# Patient Record
Sex: MALE | Race: WHITE | Employment: FULL TIME | URBAN - METROPOLITAN AREA
[De-identification: names, ages, dates, MRNs, and addresses within clinical notes are randomized per-mention and may not be internally consistent; named-entity substitution may affect disease eponyms.]

---

## 2019-12-21 ENCOUNTER — HOSPITAL ENCOUNTER (EMERGENCY)
Age: 29
Discharge: HOME | End: 2019-12-21
Attending: EMERGENCY MEDICINE

## 2019-12-21 ENCOUNTER — APPOINTMENT (OUTPATIENT)
Dept: GENERAL RADIOLOGY | Age: 29
End: 2019-12-21

## 2019-12-21 VITALS
BODY MASS INDEX: 36.45 KG/M2 | HEIGHT: 78 IN | WEIGHT: 315 LBS | HEART RATE: 88 BPM | DIASTOLIC BLOOD PRESSURE: 82 MMHG | RESPIRATION RATE: 18 BRPM | OXYGEN SATURATION: 95 % | SYSTOLIC BLOOD PRESSURE: 148 MMHG | TEMPERATURE: 98 F

## 2019-12-21 DIAGNOSIS — R07.9 CHEST PAIN, UNSPECIFIED TYPE: Primary | ICD-10-CM

## 2019-12-21 LAB
A/G RATIO: 1.5 (ref 1.1–2.2)
ALBUMIN SERPL-MCNC: 4.3 G/DL (ref 3.4–5)
ALP BLD-CCNC: 68 U/L (ref 40–129)
ALT SERPL-CCNC: 57 U/L (ref 10–40)
ANION GAP SERPL CALCULATED.3IONS-SCNC: 14 MMOL/L (ref 3–16)
AST SERPL-CCNC: 27 U/L (ref 15–37)
BASOPHILS ABSOLUTE: 0.1 K/UL (ref 0–0.2)
BASOPHILS RELATIVE PERCENT: 1 %
BILIRUB SERPL-MCNC: <0.2 MG/DL (ref 0–1)
BUN BLDV-MCNC: 14 MG/DL (ref 7–20)
CALCIUM SERPL-MCNC: 9.6 MG/DL (ref 8.3–10.6)
CHLORIDE BLD-SCNC: 98 MMOL/L (ref 99–110)
CO2: 25 MMOL/L (ref 21–32)
CREAT SERPL-MCNC: 0.7 MG/DL (ref 0.9–1.3)
D DIMER: <200 NG/ML DDU (ref 0–229)
EOSINOPHILS ABSOLUTE: 0.1 K/UL (ref 0–0.6)
EOSINOPHILS RELATIVE PERCENT: 1.6 %
GFR AFRICAN AMERICAN: >60
GFR NON-AFRICAN AMERICAN: >60
GLOBULIN: 2.8 G/DL
GLUCOSE BLD-MCNC: 113 MG/DL (ref 70–99)
HCT VFR BLD CALC: 44.3 % (ref 40.5–52.5)
HEMOGLOBIN: 15.3 G/DL (ref 13.5–17.5)
LIPASE: 14 U/L (ref 13–60)
LYMPHOCYTES ABSOLUTE: 1.6 K/UL (ref 1–5.1)
LYMPHOCYTES RELATIVE PERCENT: 26 %
MCH RBC QN AUTO: 31.1 PG (ref 26–34)
MCHC RBC AUTO-ENTMCNC: 34.5 G/DL (ref 31–36)
MCV RBC AUTO: 90.2 FL (ref 80–100)
MONOCYTES ABSOLUTE: 0.6 K/UL (ref 0–1.3)
MONOCYTES RELATIVE PERCENT: 10 %
NEUTROPHILS ABSOLUTE: 3.7 K/UL (ref 1.7–7.7)
NEUTROPHILS RELATIVE PERCENT: 61.4 %
PDW BLD-RTO: 13.1 % (ref 12.4–15.4)
PLATELET # BLD: 197 K/UL (ref 135–450)
PMV BLD AUTO: 9.6 FL (ref 5–10.5)
POTASSIUM SERPL-SCNC: 4.2 MMOL/L (ref 3.5–5.1)
RBC # BLD: 4.91 M/UL (ref 4.2–5.9)
SODIUM BLD-SCNC: 137 MMOL/L (ref 136–145)
TOTAL PROTEIN: 7.1 G/DL (ref 6.4–8.2)
TROPONIN: <0.01 NG/ML
TROPONIN: <0.01 NG/ML
WBC # BLD: 6.1 K/UL (ref 4–11)

## 2019-12-21 PROCEDURE — 96375 TX/PRO/DX INJ NEW DRUG ADDON: CPT

## 2019-12-21 PROCEDURE — 83690 ASSAY OF LIPASE: CPT

## 2019-12-21 PROCEDURE — 6370000000 HC RX 637 (ALT 250 FOR IP): Performed by: PHYSICIAN ASSISTANT

## 2019-12-21 PROCEDURE — 85379 FIBRIN DEGRADATION QUANT: CPT

## 2019-12-21 PROCEDURE — 36415 COLL VENOUS BLD VENIPUNCTURE: CPT

## 2019-12-21 PROCEDURE — 80053 COMPREHEN METABOLIC PANEL: CPT

## 2019-12-21 PROCEDURE — 71046 X-RAY EXAM CHEST 2 VIEWS: CPT

## 2019-12-21 PROCEDURE — 6360000002 HC RX W HCPCS: Performed by: PHYSICIAN ASSISTANT

## 2019-12-21 PROCEDURE — 96374 THER/PROPH/DIAG INJ IV PUSH: CPT

## 2019-12-21 PROCEDURE — 99285 EMERGENCY DEPT VISIT HI MDM: CPT

## 2019-12-21 PROCEDURE — 85025 COMPLETE CBC W/AUTO DIFF WBC: CPT

## 2019-12-21 PROCEDURE — 2500000003 HC RX 250 WO HCPCS: Performed by: PHYSICIAN ASSISTANT

## 2019-12-21 PROCEDURE — 93005 ELECTROCARDIOGRAM TRACING: CPT | Performed by: EMERGENCY MEDICINE

## 2019-12-21 PROCEDURE — 84484 ASSAY OF TROPONIN QUANT: CPT

## 2019-12-21 RX ORDER — RANITIDINE 150 MG/1
150 TABLET ORAL 2 TIMES DAILY
COMMUNITY
End: 2021-06-08

## 2019-12-21 RX ORDER — KETOROLAC TROMETHAMINE 30 MG/ML
30 INJECTION, SOLUTION INTRAMUSCULAR; INTRAVENOUS ONCE
Status: COMPLETED | OUTPATIENT
Start: 2019-12-21 | End: 2019-12-21

## 2019-12-21 RX ADMIN — FAMOTIDINE 20 MG: 10 INJECTION, SOLUTION INTRAVENOUS at 20:42

## 2019-12-21 RX ADMIN — LIDOCAINE HYDROCHLORIDE: 20 SOLUTION ORAL; TOPICAL at 20:42

## 2019-12-21 RX ADMIN — KETOROLAC TROMETHAMINE 30 MG: 30 INJECTION, SOLUTION INTRAMUSCULAR at 23:04

## 2019-12-21 ASSESSMENT — HEART SCORE: ECG: 0

## 2019-12-21 ASSESSMENT — PAIN DESCRIPTION - LOCATION: LOCATION: CHEST

## 2019-12-21 ASSESSMENT — PAIN DESCRIPTION - DESCRIPTORS: DESCRIPTORS: BURNING

## 2019-12-21 ASSESSMENT — ENCOUNTER SYMPTOMS
BACK PAIN: 0
VOMITING: 0
NAUSEA: 0
EYES NEGATIVE: 1
SHORTNESS OF BREATH: 0
ABDOMINAL PAIN: 0
COUGH: 0
COLOR CHANGE: 0

## 2019-12-21 ASSESSMENT — PAIN SCALES - GENERAL
PAINLEVEL_OUTOF10: 2
PAINLEVEL_OUTOF10: 6
PAINLEVEL_OUTOF10: 6

## 2019-12-21 ASSESSMENT — PAIN DESCRIPTION - ORIENTATION: ORIENTATION: MID

## 2019-12-21 ASSESSMENT — PAIN DESCRIPTION - PAIN TYPE: TYPE: ACUTE PAIN

## 2019-12-21 ASSESSMENT — PAIN DESCRIPTION - FREQUENCY: FREQUENCY: CONTINUOUS

## 2019-12-22 LAB
EKG ATRIAL RATE: 96 BPM
EKG DIAGNOSIS: NORMAL
EKG P AXIS: 53 DEGREES
EKG P-R INTERVAL: 148 MS
EKG Q-T INTERVAL: 344 MS
EKG QRS DURATION: 94 MS
EKG QTC CALCULATION (BAZETT): 434 MS
EKG R AXIS: 5 DEGREES
EKG T AXIS: 24 DEGREES
EKG VENTRICULAR RATE: 96 BPM

## 2019-12-22 PROCEDURE — 93010 ELECTROCARDIOGRAM REPORT: CPT | Performed by: INTERNAL MEDICINE

## 2021-02-01 NOTE — PROGRESS NOTES
[facility]     Outpatient Cardiology         Chief Complaint   Patient presents with    Chest Pain     left-sided    Shortness of Breath     with heavy exertion    Other     hx of SVT       HPI     Media Sera a 31 y.o. male here for chest pain. Hx of HTN, HLD, SVT. Prior history of SVT, no recurrence, taking diltiazem. Hypertension, uncontrolled today, will be starting beta-blockers prior to CTA, after that we will likely need to add second agent either amlodipine or losartan. Chest pain, precordial/left-sided, sharp in nature, dull sometimes, seems that sometimes the pain will get better with rest and somewhat worse with exertion but not every time, no other particular triggering or relieving factors. No associated symptoms with the pain. Seems to be mild to moderate nature. Does have a strong family history of coronary disease. PMH  Past Medical History:   Diagnosis Date    Hypertension     SVT (supraventricular tachycardia) (HCC)        PSH  Past Surgical History:   Procedure Laterality Date    LEG SURGERY      from [facility] hunting accident 2002    TYMPANOSTOMY TUBE PLACEMENT          Social HIstory  Social History     Tobacco Use    Smoking status: Current Every Day Smoker     Packs/day: 1.00    Smokeless tobacco: Never Used   Substance Use Topics    Alcohol use: Not on file    Drug use: Never       Family History  Family History   Problem Relation Age of Onset    Hypertension Mother     High Cholesterol Mother     Hypertension Father     Heart Surgery Father     Heart Attack Father        Allergies   Allergies   Allergen Reactions    Amoxicillin     Penicillins        Medications:     Home Medications:  Were reviewed and are listed in nursing record. and/or listed below    Prior to Admission medications    Medication Sig Start Date End Date Taking?  Authorizing Provider   Omeprazole Magnesium (PRILOSEC OTC PO) Take by mouth   Yes Historical Provider, MD metoprolol tartrate (LOPRESSOR) 25 MG tablet Take 1 tablet by mouth 2 times daily for 4 days Take 3 days before CTA 2/2/21 2/6/21 Yes Faiza Conley MD   diltiazem (CARDIZEM) 30 MG tablet Take 300 mg by mouth daily   Yes Historical Provider, MD   ranitidine (ZANTAC) 150 MG tablet Take 150 mg by mouth 2 times daily    Historical Provider, MD        Review of Systems   Constitutional: Negative for activity change, appetite change, diaphoresis, fatigue, fever and unexpected weight change. HENT: Negative for congestion, facial swelling, mouth sores and nosebleeds. Eyes: Negative for discharge and visual disturbance. Respiratory: Negative for cough, chest tightness, shortness of breath and wheezing. Cardiovascular: Positive for chest pain. Negative for palpitations and leg swelling. Gastrointestinal: Negative for abdominal distention, abdominal pain, blood in stool and vomiting. Endocrine: Negative for cold intolerance, heat intolerance and polyuria. Genitourinary: Negative for difficulty urinating, dysuria, frequency and hematuria. Musculoskeletal: Negative for back pain, joint swelling, myalgias and neck pain. Skin: Negative for color change, pallor and rash. Allergic/Immunologic: Negative for immunocompromised state. Neurological: Negative for dizziness, syncope, weakness, light-headedness, numbness and headaches. Hematological: Negative for adenopathy. Does not bruise/bleed easily. Psychiatric/Behavioral: Negative for behavioral problems, confusion, decreased concentration and suicidal ideas. The patient is not nervous/anxious.         Vitals:    02/02/21 1543   BP: (!) 146/100   Pulse:    Temp:     Weight: (!) 391 lb 6.4 oz (177.5 kg)       Vitals:    02/02/21 1540 02/02/21 1543   BP: (!) 150/88 (!) 146/100   Pulse: 106    Temp: 97.4 °F (36.3 °C)    Weight: (!) 391 lb 6.4 oz (177.5 kg)    Height: 6' 6\" (1.981 m)        BP Readings from Last 3 Encounters:   02/02/21 (!) 146/100 12/21/19 (!) 148/82       Wt Readings from Last 3 Encounters:   02/02/21 (!) 391 lb 6.4 oz (177.5 kg)   12/21/19 (!) 360 lb (163.3 kg)       Physical Exam  Constitutional:       General: He is not in acute distress. Appearance: He is well-developed. He is not diaphoretic. HENT:      Head: Normocephalic and atraumatic. Eyes:      Pupils: Pupils are equal, round, and reactive to light. Neck:      Musculoskeletal: Normal range of motion. Thyroid: No thyromegaly. Vascular: No JVD. Cardiovascular:      Rate and Rhythm: Normal rate and regular rhythm. Chest Wall: PMI is not displaced. Heart sounds: Normal heart sounds, S1 normal and S2 normal. No murmur. No friction rub. No gallop. Pulmonary:      Effort: Pulmonary effort is normal. No respiratory distress. Breath sounds: Normal breath sounds. No stridor. No wheezing or rales. Chest:      Chest wall: No tenderness. Abdominal:      General: Bowel sounds are normal. There is no distension. Palpations: Abdomen is soft. Tenderness: There is no abdominal tenderness. There is no guarding or rebound. Musculoskeletal: Normal range of motion. General: No tenderness. Lymphadenopathy:      Cervical: No cervical adenopathy. Skin:     General: Skin is warm and dry. Findings: No erythema or rash. Neurological:      Mental Status: He is alert and oriented to person, place, and time. Coordination: Coordination normal.   Psychiatric:         Behavior: Behavior normal.         Thought Content:  Thought content normal.         Judgment: Judgment normal.         Labs:       Lab Results   Component Value Date    WBC 6.1 12/21/2019    HGB 15.3 12/21/2019    HCT 44.3 12/21/2019    MCV 90.2 12/21/2019     12/21/2019     Lab Results   Component Value Date     12/21/2019    K 4.2 12/21/2019    CL 98 (L) 12/21/2019    CO2 25 12/21/2019    BUN 14 12/21/2019    CREATININE 0.7 (L) 12/21/2019 GLUCOSE 113 (H) 12/21/2019    CALCIUM 9.6 12/21/2019    PROT 7.1 12/21/2019    LABALBU 4.3 12/21/2019    BILITOT <0.2 12/21/2019    ALKPHOS 68 12/21/2019    AST 27 12/21/2019    ALT 57 (H) 12/21/2019    LABGLOM >60 12/21/2019    GFRAA >60 12/21/2019    AGRATIO 1.5 12/21/2019    GLOB 2.8 12/21/2019         No results found for: CHOL  No results found for: TRIG  No results found for: HDL  No results found for: LDLCHOLESTEROL, LDLCALC  No results found for: LABVLDL, VLDL  No results found for: CHOLHDLRATIO    No results found for: INR, PROTIME    The ASCVD Risk score (Christine Salomon, et al., 2013) failed to calculate for the following reasons: The 2013 ASCVD risk score is only valid for ages 36 to 78      Imaging:       Last ECG (if available):  NSR    Last Monitor/Holter    Last Stress (if available):    Last Cath (if available):    Last TTE/GABINO(if available):    Last CMR  (if available):      Assessment / Plan:     SVT (supraventricular tachycardia) (HCC)  Prior history of SVT, no recurrence. Continue diltiazem. Essential hypertension  Uncontrolled. Will likely start losartan or amlodipine after CTA    Precordial pain  Some typical and atypical features, enough risk factors, currently family history of coronary disease. We will plan for CTA. Instructed to take beta-blockers prior to CTA. Follow up in 1 months. I had the opportunity to review the clinical symptoms and presentation of Alia Alfaro. Patient's allergies and medications were reviewed and updated. Patient's past medical, surgical, social and family history were reviewed and updated. Patient's testing including laboratory, ECGs, monitor, imaging (TTE,GABINO,CMR,cath) were reviewed. Tobacco use was discussed with the patient and educated on the negative effects. I have asked the patient to not utilize these agents. All questions and concerns were addressed to the patient/family. Alternatives to my treatment were discussed. The note was completed using EMR. Every effort wasmade to ensure accuracy; however, inadvertent computerized transcription errors may be present. Thank you for allowing me to participate in thecare or [address] [place] [address].  Dewayne Cyr MD, [facility]

## 2021-02-02 ENCOUNTER — OFFICE VISIT (OUTPATIENT)
Dept: CARDIOLOGY CLINIC | Age: 31
End: 2021-02-02

## 2021-02-02 VITALS
WEIGHT: 315 LBS | HEIGHT: 78 IN | BODY MASS INDEX: 36.45 KG/M2 | DIASTOLIC BLOOD PRESSURE: 100 MMHG | SYSTOLIC BLOOD PRESSURE: 146 MMHG | TEMPERATURE: 97.4 F | HEART RATE: 106 BPM

## 2021-02-02 DIAGNOSIS — I47.1 SVT (SUPRAVENTRICULAR TACHYCARDIA) (HCC): ICD-10-CM

## 2021-02-02 DIAGNOSIS — R07.9 CHEST PAIN, UNSPECIFIED TYPE: Primary | ICD-10-CM

## 2021-02-02 DIAGNOSIS — I10 ESSENTIAL HYPERTENSION: ICD-10-CM

## 2021-02-02 DIAGNOSIS — R07.2 PRECORDIAL PAIN: ICD-10-CM

## 2021-02-02 DIAGNOSIS — R07.89 OTHER CHEST PAIN: ICD-10-CM

## 2021-02-02 PROCEDURE — 93000 ELECTROCARDIOGRAM COMPLETE: CPT | Performed by: INTERNAL MEDICINE

## 2021-02-02 PROCEDURE — 99204 OFFICE O/P NEW MOD 45 MIN: CPT | Performed by: INTERNAL MEDICINE

## 2021-02-03 PROBLEM — I10 ESSENTIAL HYPERTENSION: Status: ACTIVE | Noted: 2021-02-03

## 2021-02-03 PROBLEM — R07.2 PRECORDIAL PAIN: Status: ACTIVE | Noted: 2021-02-03

## 2021-02-03 PROBLEM — I47.1 SVT (SUPRAVENTRICULAR TACHYCARDIA) (HCC): Status: ACTIVE | Noted: 2021-02-03

## 2021-02-03 ASSESSMENT — ENCOUNTER SYMPTOMS
CHEST TIGHTNESS: 0
SHORTNESS OF BREATH: 0
ABDOMINAL PAIN: 0
COLOR CHANGE: 0
EYE DISCHARGE: 0
VOMITING: 0
COUGH: 0
BLOOD IN STOOL: 0
WHEEZING: 0
ABDOMINAL DISTENTION: 0
BACK PAIN: 0
FACIAL SWELLING: 0

## 2021-02-03 NOTE — ASSESSMENT & PLAN NOTE
Some typical and atypical features, enough risk factors, currently family history of coronary disease. We will plan for CTA. Instructed to take beta-blockers prior to CTA.

## 2021-02-26 ENCOUNTER — HOSPITAL ENCOUNTER (OUTPATIENT)
Dept: CT IMAGING | Age: 31
Discharge: HOME | End: 2021-02-26

## 2021-02-26 VITALS — SYSTOLIC BLOOD PRESSURE: 140 MMHG | HEART RATE: 65 BPM | DIASTOLIC BLOOD PRESSURE: 90 MMHG

## 2021-02-26 DIAGNOSIS — R07.9 CHEST PAIN, UNSPECIFIED TYPE: ICD-10-CM

## 2021-02-26 PROCEDURE — 2500000003 HC RX 250 WO HCPCS: Performed by: RADIOLOGY

## 2021-02-26 PROCEDURE — 75574 CT ANGIO HRT W/3D IMAGE: CPT

## 2021-02-26 PROCEDURE — 6370000000 HC RX 637 (ALT 250 FOR IP): Performed by: RADIOLOGY

## 2021-02-26 PROCEDURE — 6360000004 HC RX CONTRAST MEDICATION: Performed by: INTERNAL MEDICINE

## 2021-02-26 RX ORDER — NITROGLYCERIN 0.4 MG/1
0.4 TABLET SUBLINGUAL ONCE
Status: COMPLETED | OUTPATIENT
Start: 2021-02-26 | End: 2021-02-26

## 2021-02-26 RX ORDER — METOPROLOL TARTRATE 5 MG/5ML
5 INJECTION INTRAVENOUS EVERY 5 MIN PRN
Status: COMPLETED | OUTPATIENT
Start: 2021-02-26 | End: 2021-02-26

## 2021-02-26 RX ADMIN — IOPAMIDOL 80 ML: 755 INJECTION, SOLUTION INTRAVENOUS at 14:12

## 2021-02-26 RX ADMIN — METOPROLOL TARTRATE 5 MG: 5 INJECTION INTRAVENOUS at 13:30

## 2021-02-26 RX ADMIN — NITROGLYCERIN 0.4 MG: 0.4 TABLET SUBLINGUAL at 13:42

## 2021-02-26 RX ADMIN — METOPROLOL TARTRATE 5 MG: 5 INJECTION INTRAVENOUS at 13:42

## 2021-02-26 RX ADMIN — METOPROLOL TARTRATE 5 MG: 5 INJECTION INTRAVENOUS at 13:37

## 2021-02-26 RX ADMIN — METOPROLOL TARTRATE 5 MG: 5 INJECTION INTRAVENOUS at 14:04

## 2021-02-26 NOTE — FLOWSHEET NOTE
02/26/21 1415 02/26/21 1430 02/26/21 1500   Vital Signs   Pulse 64 63 65   BP (!) 144/88 127/82 (!) 140/90       Pt arrived to [facility] post CTA test. VS obtained per protocol, see above. Pt denies any acute needs at this time. PIV deaccessed   Per protocol, site CDI gauze and tape in place to site. Discharge instructions provided to Pt and Pt verbalized understanding. All pt belongings gathered and pt ambulated to front entrance where he drive himself home.

## 2021-02-26 NOTE — FLOWSHEET NOTE
Patient arrived alert and orientated x 4, ambulatory, denies pain. Initial HR 80 SR, 100% on RA. CTA Coronary study done without complication. Patient given IV Metoprolol 20 mg  and 1 SL Nitro. Patient required 1 extra dose to achieve optimal hr for study. Orders per Dr. Dani Stauffer. 151/77  See flow sheets for VS.     Patient was taken to [facility] after study in stable condition  and recovered for 30 minutes.

## 2021-03-02 ENCOUNTER — OFFICE VISIT (OUTPATIENT)
Dept: CARDIOLOGY CLINIC | Age: 31
End: 2021-03-02

## 2021-03-02 VITALS
SYSTOLIC BLOOD PRESSURE: 140 MMHG | TEMPERATURE: 98.1 F | HEIGHT: 78 IN | BODY MASS INDEX: 36.45 KG/M2 | HEART RATE: 96 BPM | WEIGHT: 315 LBS | DIASTOLIC BLOOD PRESSURE: 90 MMHG | OXYGEN SATURATION: 98 %

## 2021-03-02 DIAGNOSIS — I47.1 SVT (SUPRAVENTRICULAR TACHYCARDIA) (HCC): ICD-10-CM

## 2021-03-02 DIAGNOSIS — I10 ESSENTIAL HYPERTENSION: ICD-10-CM

## 2021-03-02 DIAGNOSIS — R07.2 PRECORDIAL PAIN: ICD-10-CM

## 2021-03-02 PROCEDURE — 99214 OFFICE O/P EST MOD 30 MIN: CPT | Performed by: INTERNAL MEDICINE

## 2021-03-02 RX ORDER — OLMESARTAN MEDOXOMIL 20 MG/1
20 TABLET ORAL DAILY
Qty: 90 TABLET | Refills: 3 | Status: SHIPPED | OUTPATIENT
Start: 2021-03-02

## 2021-03-02 ASSESSMENT — ENCOUNTER SYMPTOMS
WHEEZING: 0
EYE DISCHARGE: 0
BACK PAIN: 0
COUGH: 0
BLOOD IN STOOL: 0
VOMITING: 0
COLOR CHANGE: 0
ABDOMINAL DISTENTION: 0
ABDOMINAL PAIN: 0
SHORTNESS OF BREATH: 0
FACIAL SWELLING: 0
CHEST TIGHTNESS: 0

## 2021-03-02 NOTE — PROGRESS NOTES
appetite change, diaphoresis, fatigue, fever and unexpected weight change. HENT: Negative for congestion, facial swelling, mouth sores and nosebleeds. Eyes: Negative for discharge and visual disturbance. Respiratory: Negative for cough, chest tightness, shortness of breath and wheezing. Cardiovascular: Negative for chest pain, palpitations and leg swelling. Gastrointestinal: Negative for abdominal distention, abdominal pain, blood in stool and vomiting. Endocrine: Negative for cold intolerance, heat intolerance and polyuria. Genitourinary: Negative for difficulty urinating, dysuria, frequency and hematuria. Musculoskeletal: Negative for back pain, joint swelling, myalgias and neck pain. Skin: Negative for color change, pallor and rash. Allergic/Immunologic: Negative for immunocompromised state. Neurological: Negative for dizziness, syncope, weakness, light-headedness, numbness and headaches. Hematological: Negative for adenopathy. Does not bruise/bleed easily. Psychiatric/Behavioral: Negative for behavioral problems, confusion, decreased concentration and suicidal ideas. The patient is not nervous/anxious. Vitals:    03/02/21 1235   BP: (!) 140/90   Pulse:    Temp:    SpO2:     Weight: (!) 384 lb 3.2 oz (174.3 kg)       Vitals:    03/02/21 1231 03/02/21 1235   BP: (!) 144/100 (!) 140/90   Site: Left Upper Arm Left Upper Arm   Position: Sitting Sitting   Cuff Size: Large Adult Large Adult   Pulse: 96    Temp: 98.1 °F (36.7 °C)    SpO2: 98%    Weight: (!) 384 lb 3.2 oz (174.3 kg)    Height: 6' 6\" (1.981 m)        BP Readings from Last 3 Encounters:   03/02/21 (!) 140/90   02/26/21 (!) 140/90   02/02/21 (!) 146/100       Wt Readings from Last 3 Encounters:   03/02/21 (!) 384 lb 3.2 oz (174.3 kg)   02/02/21 (!) 391 lb 6.4 oz (177.5 kg)   12/21/19 (!) 360 lb (163.3 kg)       Physical Exam  Constitutional:       General: He is not in acute distress. Appearance: He is well-developed. He is not diaphoretic. HENT:      Head: Normocephalic and atraumatic. Eyes:      Pupils: Pupils are equal, round, and reactive to light. Neck:      Musculoskeletal: Normal range of motion. Thyroid: No thyromegaly. Vascular: No JVD. Cardiovascular:      Rate and Rhythm: Normal rate and regular rhythm. Chest Wall: PMI is not displaced. Heart sounds: Normal heart sounds, S1 normal and S2 normal. No murmur. No friction rub. No gallop. Pulmonary:      Effort: Pulmonary effort is normal. No respiratory distress. Breath sounds: Normal breath sounds. No stridor. No wheezing or rales. Chest:      Chest wall: No tenderness. Abdominal:      General: Bowel sounds are normal. There is no distension. Palpations: Abdomen is soft. Tenderness: There is no abdominal tenderness. There is no guarding or rebound. Musculoskeletal: Normal range of motion. General: No tenderness. Lymphadenopathy:      Cervical: No cervical adenopathy. Skin:     General: Skin is warm and dry. Findings: No erythema or rash. Neurological:      Mental Status: He is alert and oriented to person, place, and time. Coordination: Coordination normal.   Psychiatric:         Behavior: Behavior normal.         Thought Content:  Thought content normal.         Judgment: Judgment normal.         Labs:       Lab Results   Component Value Date    WBC 6.1 12/21/2019    HGB 15.3 12/21/2019    HCT 44.3 12/21/2019    MCV 90.2 12/21/2019     12/21/2019     Lab Results   Component Value Date     12/21/2019    K 4.2 12/21/2019    CL 98 (L) 12/21/2019    CO2 25 12/21/2019    BUN 14 12/21/2019    CREATININE 0.7 (L) 12/21/2019    GLUCOSE 113 (H) 12/21/2019    CALCIUM 9.6 12/21/2019    PROT 7.1 12/21/2019    LABALBU 4.3 12/21/2019    BILITOT <0.2 12/21/2019    ALKPHOS 68 12/21/2019    AST 27 12/21/2019    ALT 57 (H) 12/21/2019    LABGLOM >60 12/21/2019    GFRAA >60 12/21/2019    AGRATIO 1.5 12/21/2019    GLOB 2.8 12/21/2019         No results found for: CHOL  No results found for: TRIG  No results found for: HDL  No results found for: LDLCHOLESTEROL, LDLCALC  No results found for: LABVLDL, VLDL  No results found for: CHOLHDLRATIO    No results found for: INR, PROTIME    The ASCVD Risk score (Minetta Riedel., et al., 2013) failed to calculate for the following reasons: The 2013 ASCVD risk score is only valid for ages 36 to 78      Imaging:       Last ECG (if available):  NSR    Last Monitor/Holter    Last Stress (if available):    Last Cath (if available):    Last TTE/GABINO(if available):    Last CMR  (if available):    CTA  1. Normal coronary arteries. 2. Right coronary artery dominance. 3. Total calcium score 0. Initial calculation is inaccurate due to large body habitus and artifact.       CAD RADS 0. Assessment / Plan:     Precordial pain  Normal CTA    Essential hypertension  Still uncontrolled, add olmesartan. SVT (supraventricular tachycardia) (HCC)  Continue diltiazem. .      Follow up in 6 months. I had the opportunity to review the clinical symptoms and presentation of Sal Schultz. Patient's allergies and medications were reviewed and updated. Patient's past medical, surgical, social and family history were reviewed and updated. Patient's testing including laboratory, ECGs, monitor, imaging (TTE,GABINO,CMR,cath) were reviewed. Tobacco use was discussed with the patient and educated on the negative effects. I have asked the patient to not utilize these agents. All questions and concerns were addressed to the patient/family. Alternatives to my treatment were discussed. The note was completed using EMR. Every effort wasmade to ensure accuracy; however, inadvertent computerized transcription errors may be present. Thank you for allowing me to participate in thecare or [facility].  Idella Boeck, MD, [facility], [facility]

## 2021-06-07 ASSESSMENT — ENCOUNTER SYMPTOMS
SHORTNESS OF BREATH: 0
ABDOMINAL PAIN: 0
WHEEZING: 0
BLOOD IN STOOL: 0
EYE DISCHARGE: 0
CHEST TIGHTNESS: 0
COLOR CHANGE: 0
FACIAL SWELLING: 0
BACK PAIN: 0
COUGH: 0
VOMITING: 0
ABDOMINAL DISTENTION: 0

## 2021-06-07 NOTE — PROGRESS NOTES
[facility]     Outpatient Cardiology         Chief Complaint   Patient presents with    Follow-Up from Hospital     routine cardiac evaluation for tachycardia       HPI     Luanne Mcgregor a 31 y.o. male here for follow up for SVT and HTN. HTN, well-controlled on current medications. No need for changes today. SVT, previously seen for prior episodes of SVT very sporadic, most recently needed to go to the emergency room, required cardioversion x2. Requested telemetry strips. We will continue metoprolol 25 daily plus extra dose as needed along with diltiazem. EP will follow      PMH  Past Medical History:   Diagnosis Date    Hypertension     SVT (supraventricular tachycardia) (HCC)        PSH  Past Surgical History:   Procedure Laterality Date    CARDIOVERSION      LEG SURGERY      from [facility] 2002    TYMPANOSTOMY TUBE PLACEMENT          Social HIstory  Social History     Tobacco Use    Smoking status: Current Every Day Smoker     Packs/day: 1.00    Smokeless tobacco: Never Used   Substance Use Topics    Alcohol use: Not on file    Drug use: Never       Family History  Family History   Problem Relation Age of Onset    Hypertension Mother     High Cholesterol Mother     Hypertension Father     Heart Surgery Father     Heart Attack Father        Allergies   Allergies   Allergen Reactions    Amoxicillin     Penicillins        Medications:     Home Medications:  Were reviewed and are listed in nursing record. and/or listed below    Prior to Admission medications    Medication Sig Start Date End Date Taking?  Authorizing Provider   metoprolol succinate (TOPROL XL) 25 MG extended release tablet Take 1 tablet by mouth daily 6/8/21  Yes Jaden Guillaume MD   [facility] 20 MG tablet Take 1 tablet by mouth daily 3/2/21  Yes Jaden Guillaume MD   Omeprazole Magnesium (PRILOSEC OTC PO) Take by mouth   Yes Historical Provider, MD   diltiazem (CARDIZEM) 30 MG tablet Take 300 mg by mouth daily   Yes Historical Provider, MD        Review of Systems   Constitutional: Negative for activity change, appetite change, diaphoresis, fatigue, fever and unexpected weight change. HENT: Negative for congestion, facial swelling, mouth sores and nosebleeds. Eyes: Negative for discharge and visual disturbance. Respiratory: Negative for cough, chest tightness, shortness of breath and wheezing. Cardiovascular: Negative for chest pain, palpitations and leg swelling. Gastrointestinal: Negative for abdominal distention, abdominal pain, blood in stool and vomiting. Endocrine: Negative for cold intolerance, heat intolerance and polyuria. Genitourinary: Negative for difficulty urinating, dysuria, frequency and hematuria. Musculoskeletal: Negative for back pain, joint swelling, myalgias and neck pain. Skin: Negative for color change, pallor and rash. Allergic/Immunologic: Negative for immunocompromised state. Neurological: Negative for dizziness, syncope, weakness, light-headedness, numbness and headaches. Hematological: Negative for adenopathy. Does not bruise/bleed easily. Psychiatric/Behavioral: Negative for behavioral problems, confusion, decreased concentration and suicidal ideas. The patient is not nervous/anxious. Vitals:    06/08/21 1202   BP: 130/80   Pulse: 78   SpO2: 98%    Weight: (!) 364 lb (165.1 kg)       Vitals:    06/08/21 1202   BP: 130/80   Site: Left Upper Arm   Position: Sitting   Cuff Size: Large Adult   Pulse: 78   SpO2: 98%   Weight: (!) 364 lb (165.1 kg)   Height: 6' 6\" (1.981 m)       BP Readings from Last 3 Encounters:   06/08/21 130/80   03/02/21 (!) 140/90   02/26/21 (!) 140/90       Wt Readings from Last 3 Encounters:   06/08/21 (!) 364 lb (165.1 kg)   03/02/21 (!) 384 lb 3.2 oz (174.3 kg)   02/02/21 (!) 391 lb 6.4 oz (177.5 kg)       Physical Exam  Constitutional:       General: He is not in acute distress.      Appearance: He is 12/21/2019    AGRATIO 1.5 12/21/2019    GLOB 2.8 12/21/2019         No results found for: CHOL  No results found for: TRIG  No results found for: HDL  No results found for: LDLCHOLESTEROL, LDLCALC  No results found for: LABVLDL, VLDL  No results found for: CHOLHDLRATIO    No results found for: INR, PROTIME    The ASCVD Risk score (Val Chairez., et al., 2013) failed to calculate for the following reasons: The 2013 ASCVD risk score is only valid for ages 36 to 78      Imaging:       Last ECG (if available):  NSR    Last Monitor/Holter    Last Stress (if available):    Last Cath (if available):    Last TTE/GABINO(if available):    Last CMR  (if available):    CTA  1. Normal coronary arteries. 2. Right coronary artery dominance. 3. Total calcium score 0. Initial calculation is inaccurate due to large body habitus and artifact.       CAD RADS 0. Assessment / Plan:   Essential hypertension  Well-controlled, continue current medications    SVT (supraventricular tachycardia) (HCC)  Referred to EP for possible ablation  Continue Dilt and metop  Extra dose metop if needed. Follow up in 6 months. I had the opportunity to review the clinical symptoms and presentation of Carlos Alberto Seay. Patient's allergies and medications were reviewed and updated. Patient's past medical, surgical, social and family history were reviewed and updated. Patient's testing including laboratory, ECGs, monitor, imaging (TTE,GABINO,CMR,cath) were reviewed. Tobacco use was discussed with the patient and educated on the negative effects. I have asked the patient to not utilize these agents. All questions and concerns were addressed to the patient/family. Alternatives to my treatment were discussed. The note was completed using EMR. Every effort wasmade to ensure accuracy; however, inadvertent computerized transcription errors may be present.     Thank you for allowing [facility] to [facility] in [facility] or [address] [place] [address]. Sherice Anderson MD, [facility], [facility]

## 2021-06-08 ENCOUNTER — OFFICE VISIT (OUTPATIENT)
Dept: CARDIOLOGY CLINIC | Age: 31
End: 2021-06-08

## 2021-06-08 VITALS
DIASTOLIC BLOOD PRESSURE: 80 MMHG | HEIGHT: 78 IN | HEART RATE: 78 BPM | BODY MASS INDEX: 36.45 KG/M2 | SYSTOLIC BLOOD PRESSURE: 130 MMHG | OXYGEN SATURATION: 98 % | WEIGHT: 315 LBS

## 2021-06-08 DIAGNOSIS — I47.1 SVT (SUPRAVENTRICULAR TACHYCARDIA) (HCC): ICD-10-CM

## 2021-06-08 DIAGNOSIS — I10 ESSENTIAL HYPERTENSION: ICD-10-CM

## 2021-06-08 PROCEDURE — 99214 OFFICE O/P EST MOD 30 MIN: CPT | Performed by: INTERNAL MEDICINE

## 2021-06-08 RX ORDER — METOPROLOL SUCCINATE 25 MG/1
25 TABLET, EXTENDED RELEASE ORAL DAILY
Qty: 90 TABLET | Refills: 1 | Status: SHIPPED | OUTPATIENT
Start: 2021-06-08